# Patient Record
Sex: MALE | Race: WHITE | NOT HISPANIC OR LATINO | Employment: OTHER | RURAL
[De-identification: names, ages, dates, MRNs, and addresses within clinical notes are randomized per-mention and may not be internally consistent; named-entity substitution may affect disease eponyms.]

---

## 2018-02-01 ENCOUNTER — HISTORICAL (OUTPATIENT)
Dept: ADMINISTRATIVE | Facility: HOSPITAL | Age: 33
End: 2018-02-01

## 2018-02-02 LAB
LAB AP CLINICAL INFORMATION: NORMAL
LAB AP DIAGNOSIS - HISTORICAL: NORMAL
LAB AP GROSS PATHOLOGY - HISTORICAL: NORMAL
LAB AP SPECIMEN SUBMITTED - HISTORICAL: NORMAL

## 2022-07-21 ENCOUNTER — HOSPITAL ENCOUNTER (EMERGENCY)
Facility: HOSPITAL | Age: 37
Discharge: HOME | End: 2022-07-21
Attending: INTERNAL MEDICINE

## 2022-07-21 VITALS
HEIGHT: 74 IN | SYSTOLIC BLOOD PRESSURE: 137 MMHG | BODY MASS INDEX: 26.95 KG/M2 | HEART RATE: 72 BPM | WEIGHT: 210 LBS | RESPIRATION RATE: 18 BRPM | DIASTOLIC BLOOD PRESSURE: 86 MMHG | OXYGEN SATURATION: 100 % | TEMPERATURE: 98 F

## 2022-07-21 DIAGNOSIS — R55 NEAR SYNCOPE: ICD-10-CM

## 2022-07-21 DIAGNOSIS — J32.0 LEFT MAXILLARY SINUSITIS: ICD-10-CM

## 2022-07-21 DIAGNOSIS — R42 DIZZINESS: Primary | ICD-10-CM

## 2022-07-21 LAB
ANION GAP SERPL CALCULATED.3IONS-SCNC: 19 MMOL/L (ref 7–16)
BASOPHILS # BLD AUTO: 0.03 K/UL (ref 0–0.2)
BASOPHILS NFR BLD AUTO: 0.5 % (ref 0–1)
BUN SERPL-MCNC: 14 MG/DL (ref 7–18)
BUN/CREAT SERPL: 14 (ref 6–20)
CALCIUM SERPL-MCNC: 9.2 MG/DL (ref 8.5–10.1)
CHLORIDE SERPL-SCNC: 97 MMOL/L (ref 98–107)
CO2 SERPL-SCNC: 23 MMOL/L (ref 21–32)
CREAT SERPL-MCNC: 0.97 MG/DL (ref 0.7–1.3)
DIFFERENTIAL METHOD BLD: ABNORMAL
EOSINOPHIL # BLD AUTO: 0.06 K/UL (ref 0–0.5)
EOSINOPHIL NFR BLD AUTO: 1 % (ref 1–4)
ERYTHROCYTE [DISTWIDTH] IN BLOOD BY AUTOMATED COUNT: 13.3 % (ref 11.5–14.5)
GLUCOSE SERPL-MCNC: 167 MG/DL (ref 74–106)
HCT VFR BLD AUTO: 49.8 % (ref 40–54)
HGB BLD-MCNC: 16.5 G/DL (ref 13.5–18)
IMM GRANULOCYTES # BLD AUTO: 0.02 K/UL (ref 0–0.04)
IMM GRANULOCYTES NFR BLD: 0.3 % (ref 0–0.4)
LYMPHOCYTES # BLD AUTO: 2.47 K/UL (ref 1–4.8)
LYMPHOCYTES NFR BLD AUTO: 40 % (ref 27–41)
MCH RBC QN AUTO: 29.5 PG (ref 27–31)
MCHC RBC AUTO-ENTMCNC: 33.1 G/DL (ref 32–36)
MCV RBC AUTO: 88.9 FL (ref 80–96)
MONOCYTES # BLD AUTO: 0.67 K/UL (ref 0–0.8)
MONOCYTES NFR BLD AUTO: 10.8 % (ref 2–6)
MPC BLD CALC-MCNC: 10.2 FL (ref 9.4–12.4)
NEUTROPHILS # BLD AUTO: 2.93 K/UL (ref 1.8–7.7)
NEUTROPHILS NFR BLD AUTO: 47.4 % (ref 53–65)
PLATELET # BLD AUTO: 278 K/UL (ref 150–400)
POTASSIUM SERPL-SCNC: 3.6 MMOL/L (ref 3.5–5.1)
RBC # BLD AUTO: 5.6 M/UL (ref 4.6–6.2)
SODIUM SERPL-SCNC: 135 MMOL/L (ref 136–145)
TROPONIN I SERPL HS-MCNC: 6.4 PG/ML
WBC # BLD AUTO: 6.18 K/UL (ref 4.5–11)

## 2022-07-21 PROCEDURE — 93010 EKG 12-LEAD: ICD-10-PCS | Mod: ,,, | Performed by: INTERNAL MEDICINE

## 2022-07-21 PROCEDURE — 99285 EMERGENCY DEPT VISIT HI MDM: CPT | Mod: 25

## 2022-07-21 PROCEDURE — 63600175 PHARM REV CODE 636 W HCPCS: Performed by: INTERNAL MEDICINE

## 2022-07-21 PROCEDURE — 25000003 PHARM REV CODE 250: Performed by: INTERNAL MEDICINE

## 2022-07-21 PROCEDURE — 85025 COMPLETE CBC W/AUTO DIFF WBC: CPT | Performed by: INTERNAL MEDICINE

## 2022-07-21 PROCEDURE — 80048 BASIC METABOLIC PNL TOTAL CA: CPT | Performed by: INTERNAL MEDICINE

## 2022-07-21 PROCEDURE — 93010 ELECTROCARDIOGRAM REPORT: CPT | Mod: ,,, | Performed by: INTERNAL MEDICINE

## 2022-07-21 PROCEDURE — 99284 EMERGENCY DEPT VISIT MOD MDM: CPT | Mod: ,,, | Performed by: INTERNAL MEDICINE

## 2022-07-21 PROCEDURE — 36415 COLL VENOUS BLD VENIPUNCTURE: CPT | Performed by: INTERNAL MEDICINE

## 2022-07-21 PROCEDURE — 96374 THER/PROPH/DIAG INJ IV PUSH: CPT

## 2022-07-21 PROCEDURE — 99284 PR EMERGENCY DEPT VISIT,LEVEL IV: ICD-10-PCS | Mod: ,,, | Performed by: INTERNAL MEDICINE

## 2022-07-21 PROCEDURE — 93005 ELECTROCARDIOGRAM TRACING: CPT

## 2022-07-21 PROCEDURE — 84484 ASSAY OF TROPONIN QUANT: CPT | Performed by: INTERNAL MEDICINE

## 2022-07-21 RX ORDER — ONDANSETRON 2 MG/ML
4 INJECTION INTRAMUSCULAR; INTRAVENOUS
Status: COMPLETED | OUTPATIENT
Start: 2022-07-21 | End: 2022-07-21

## 2022-07-21 RX ORDER — CETIRIZINE HYDROCHLORIDE 10 MG/1
10 TABLET ORAL DAILY
Qty: 30 TABLET | Refills: 0 | Status: SHIPPED | OUTPATIENT
Start: 2022-07-21 | End: 2023-05-23

## 2022-07-21 RX ORDER — MECLIZINE HYDROCHLORIDE 25 MG/1
25 TABLET ORAL 3 TIMES DAILY PRN
Qty: 20 TABLET | Refills: 0 | Status: SHIPPED | OUTPATIENT
Start: 2022-07-21 | End: 2023-05-23

## 2022-07-21 RX ORDER — MECLIZINE HCL 12.5 MG 12.5 MG/1
25 TABLET ORAL
Status: COMPLETED | OUTPATIENT
Start: 2022-07-21 | End: 2022-07-21

## 2022-07-21 RX ADMIN — SODIUM CHLORIDE 1000 ML: 9 INJECTION, SOLUTION INTRAVENOUS at 12:07

## 2022-07-21 RX ADMIN — ONDANSETRON 4 MG: 2 INJECTION INTRAMUSCULAR; INTRAVENOUS at 01:07

## 2022-07-21 RX ADMIN — MECLIZINE HYDROCHLORIDE 25 MG: 12.5 TABLET ORAL at 01:07

## 2022-07-21 NOTE — ED PROVIDER NOTES
Encounter Date: 7/21/2022       History     Chief Complaint   Patient presents with    Dizziness      Patient states he was riding his  this morning, when he stepped off he had dizziness   And vertigo symptoms. Patient denies any chest pain, shortness of breath, headache, nausea vomiting.  Patient states he took her indomethacin for gout in his foot this morning.        Review of patient's allergies indicates:  No Known Allergies  Past Medical History:   Diagnosis Date    Gout, unspecified      Past Surgical History:   Procedure Laterality Date    CLAVICLE SURGERY      HERNIA REPAIR       History reviewed. No pertinent family history.  Social History     Tobacco Use    Smoking status: Never Smoker   Substance Use Topics    Alcohol use: Yes     Review of Systems   Constitutional: Negative for fever.   HENT: Negative for sore throat.    Respiratory: Negative for shortness of breath.    Cardiovascular: Negative for chest pain.   Gastrointestinal: Negative for nausea.   Genitourinary: Negative for dysuria.   Musculoskeletal: Negative for back pain.   Skin: Negative for rash.   Neurological: Negative for weakness.   Hematological: Does not bruise/bleed easily.       Physical Exam     Initial Vitals [07/21/22 1237]   BP Pulse Resp Temp SpO2   (!) 149/93 74 18 97.9 °F (36.6 °C) 98 %      MAP       --         Physical Exam    Nursing note and vitals reviewed.  Constitutional: He appears well-developed.   HENT:   Head: Normocephalic.   Eyes: Pupils are equal, round, and reactive to light.   Neck:   Normal range of motion.  Cardiovascular: Normal rate.   Pulmonary/Chest: Breath sounds normal.   Abdominal: Abdomen is soft.   Musculoskeletal:         General: Normal range of motion.      Cervical back: Normal range of motion.     Neurological: He is alert. He has normal strength and normal reflexes. No cranial nerve deficit. GCS eye subscore is 4. GCS verbal subscore is 5. GCS motor subscore is 6.   Skin: Skin  is warm.         Medical Screening Exam   See Full Note    ED Course   Procedures  Labs Reviewed   BASIC METABOLIC PANEL - Abnormal; Notable for the following components:       Result Value    Sodium 135 (*)     Chloride 97 (*)     Anion Gap 19 (*)     Glucose 167 (*)     All other components within normal limits   CBC WITH DIFFERENTIAL - Abnormal; Notable for the following components:    Neutrophils % 47.4 (*)     Monocytes % 10.8 (*)     All other components within normal limits   TROPONIN I - Normal   CBC W/ AUTO DIFFERENTIAL    Narrative:     The following orders were created for panel order CBC auto differential.  Procedure                               Abnormality         Status                     ---------                               -----------         ------                     CBC with Differential[[id number]]        Abnormal            Final result                 Please view results for these tests on the individual orders.        ECG Results          EKG 12-lead (Final result)  Result time 07/21/22 12:52:47    Final result by Interface, Lab In [facility] (07/21/22 12:52:47)                 Narrative:    Test Reason : R55,    Vent. Rate : 073 BPM     Atrial Rate : 073 BPM     P-R Int : 110 ms          QRS Dur : 094 ms      QT Int : 408 ms       P-R-T Axes : 038 045 -16 degrees     QTc Int : 449 ms    Sinus rhythm with short VA  T wave abnormality, consider inferior ischemia  Abnormal ECG  No previous ECGs available  Confirmed by Daniel Frost MD ([id number]) on 7/21/2022 12:52:35 PM    Referred By: NARINDER   SELF           Confirmed By:Daniel Frost MD                  ED Interpretation by Daniel Frost MD (07/21/22 12:49:02, [facility] Emergency Department, Emergency Medicine)    Normal sinus rhythm rate of 73 no acute ischemic changes                            Imaging Results          CT Head Without Contrast (Final result)  Result time 07/21/22 13:01:54    Final result by Guido Martin MD  (07/21/22 13:01:54)                 Impression:      No acute intracranial process    Mild left maxillary sinusitis      Electronically signed by: Guido Cooperbrendamerissa  Date:    07/21/2022  Time:    13:01             Narrative:    EXAMINATION:  CT head without contrast    CLINICAL HISTORY:  Dizziness, persistent/recurrent, cardiac or vascular cause suspected;    TECHNIQUE:  Transaxial CT sections were obtained through the brain without contrast.    The CT examination was performed using one or more of the following dose reduction techniques: Automated exposure control, adjustment of the mA and kV according to patient's size, use of acute or iterative reconstruction techniques.    COMPARISON:  No previous head CT available    FINDINGS:  The ventricles are midline in position without evidence of hydrocephalus. There is no mass or area of parenchymal hemorrhage. There is no gross CT evidence of acute cortical stroke. There is no extra-axial hematoma. There is a small fluid level in the largely visualized left maxillary sinus.  There is no obvious skull fracture.                                 Medications   meclizine tablet 25 mg (has no administration in time range)   sodium chloride 0.9% bolus 1,000 mL (0 mLs Intravenous Stopped 7/21/22 1321)   ondansetron injection 4 mg (4 mg Intravenous Given 7/21/22 1330)     Medical Decision Making:   ED Management:   Discuss results of the test with the patient, CT scan showed left maxillary sinusitis, blood sugar was 167 but is nonfasting, this suggested patient follow with his primary care provider, and recheck labs, and refer to specialist if indicated    Additional MDM:   PERC Rule:   Age is greater than or equal to 50 = 0.0  Heart Rate is greater than or equal to 100 = 0.0  SaO2 on room air < 95% = 0.0  Unilateral leg swelling = 0.0  Hemoptysis = 0.0  Recent surgery or trauma = 0.0  Prior PE or DVT =  0.0  Hormone use = 0.00  PERC Score = 0    Well's Criteria Score:  -Clinical  symptoms of DVT (leg swelling, pain with palpation) = 0.0  -Other diagnosis less likely than pulmonary embolism =            0.0  -Heart Rate >100 =   0.0  -Immobilization (= or > than 3 days) or surgery in the previous 4 weeks = 0.0  -Previous DVT/PE = 0.0  -Hemoptysis =          0.0  -Malignancy =           0.0  Well's Probability Score =    0        DILSHAD Score:   Age over 65:                                    0.00   > or = to 3 CAD risk factors:           0.00  Established CAD:                            0.00  > or = to 2 anginal events in the past 24 hours: 0.00  Use of ASA in past 7 days:              0.00  Elevated Enzymes:                         0.00  ST Depression > or = to 0.05 mV:  0.00  DILSHAD score: 0    NIH Stroke Scale:   Level of consciousness = 0 - alert  LOC questions = 0 - answers both correctly  LOC commands = 0 - performs both correctly  Best gaze = 0 - normal  Visual = 0 - no visual loss  Facial palsy = 0 - normal  Motor left arm =  0 - no drift  Motor right arm =  0 - no drift  Motor left leg = 0 - no drift  Motor right leg =  0 - no drift  Limb ataxia = 0 - absent  Sensory = 0 - normal  Best language = 0 - no aphasia  Dysarthria = 0 - normal articulation  Extinction and inattention = 0 - no neglect  NIH Stroke Scale Total = 0          ED Course as of 07/21/22 1342   Thu Jul 21, 2022   1249 EKG 12-lead [PW]   1255 CBC auto differential(!) [PW]   1306 CT Head Without Contrast [PW]   1307 Troponin I [PW]   1307 Troponin I High Sensitivity: 6.4 [PW]   1307 Basic metabolic panel(!) [PW]   1340  Call to patient room because he still was somewhat dizzy.  He said the chest tightness went away.  He states that he feels better but not back to normal.  Offered to transfer patient for neurologic evaluation but he refuses.  States that this time he prefer to follow with his primary care provider [PW]      ED Course User Index  [PW] Daniel Frost MD          Clinical Impression:   Final diagnoses:  [R55]  Near syncope  [R42] Dizziness (Primary)  [J32.0] Left maxillary sinusitis          ED Disposition Condition    Discharge Stable        ED Prescriptions     Medication Sig Dispense Start Date End Date Auth. Provider    cetirizine (ZYRTEC) 10 MG tablet Take 1 tablet (10 mg total) by mouth once daily. 30 tablet 7/21/2022 7/21/2023 Daniel Frost MD    meclizine (ANTIVERT) 25 mg tablet Take 1 tablet (25 mg total) by mouth 3 (three) times daily as needed. 20 tablet 7/21/2022  Daniel Frost MD        Follow-up Information     Follow up With Specialties Details Why Contact Info    Migdalia Franco MD Family Medicine In 1 day  [address]  [facility]   [place] [id number] [phone]             Daniel Frost MD  07/21/22 1323       Daniel Frost MD  07/21/22 1326       Daniel Frost MD  07/21/22 134

## 2022-07-22 ENCOUNTER — TELEPHONE (OUTPATIENT)
Dept: EMERGENCY MEDICINE | Facility: HOSPITAL | Age: 37
End: 2022-07-22

## 2023-05-23 ENCOUNTER — OFFICE VISIT (OUTPATIENT)
Dept: FAMILY MEDICINE | Facility: CLINIC | Age: 38
End: 2023-05-23

## 2023-05-23 VITALS
HEIGHT: 74 IN | TEMPERATURE: 98 F | SYSTOLIC BLOOD PRESSURE: 126 MMHG | BODY MASS INDEX: 26.33 KG/M2 | WEIGHT: 205.19 LBS | OXYGEN SATURATION: 97 % | HEART RATE: 58 BPM | DIASTOLIC BLOOD PRESSURE: 78 MMHG

## 2023-05-23 DIAGNOSIS — F33.1 MODERATE EPISODE OF RECURRENT MAJOR DEPRESSIVE DISORDER: ICD-10-CM

## 2023-05-23 DIAGNOSIS — M77.12 LEFT TENNIS ELBOW: Primary | ICD-10-CM

## 2023-05-23 PROCEDURE — 99213 OFFICE O/P EST LOW 20 MIN: CPT | Mod: ,,, | Performed by: FAMILY MEDICINE

## 2023-05-23 PROCEDURE — 99213 PR OFFICE/OUTPT VISIT, EST, LEVL III, 20-29 MIN: ICD-10-PCS | Mod: ,,, | Performed by: FAMILY MEDICINE

## 2023-05-23 RX ORDER — BUPROPION HYDROCHLORIDE 100 MG/1
100 TABLET, EXTENDED RELEASE ORAL 2 TIMES DAILY
Qty: 60 TABLET | Refills: 11 | Status: SHIPPED | OUTPATIENT
Start: 2023-05-23 | End: 2024-05-22

## 2023-05-23 NOTE — PROGRESS NOTES
Gagandeep Grace MD   [facility]  [address] [place] [address]     PATIENT NAME: Harshad Bhakta  : 1985  DATE: 23  MRN: [id number]      Billing Provider: Gagandeep Grace MD  Level of Service: [place] OFFICE/OUTPT VISIT, EST, LEVL III, 20-29 MIN  Patient PCP Information       Provider PCP Type    Migdalia Franco MD General            Reason for Visit / Chief Complaint: Elbow Pain (Left elbow pain-increased with lifting and squeezing objects x 2 weeks. ) and Mood (Discuss mood. In the past taking medication that helped with mood. Lexapro 10mg rx'd in .)         History of Present Illness / Problem Focused Workflow     Harshad Bhakta presents to the clinic with Elbow Pain (Left elbow pain-increased with lifting and squeezing objects x 2 weeks. ) and Mood (Discuss mood. In the past taking medication that helped with mood. Lexapro 10mg rx'd in .)     HPI    Review of Systems     Review of Systems   Constitutional:  Negative for activity change, appetite change, fatigue and fever.   HENT:  Negative for nasal congestion, ear pain, hearing loss, sinus pressure/congestion and sore throat.    Respiratory:  Negative for cough, chest tightness and shortness of breath.    Cardiovascular:  Negative for chest pain and palpitations.   Gastrointestinal:  Negative for abdominal pain and fecal incontinence.   Genitourinary:  Negative for bladder incontinence, difficulty urinating and erectile dysfunction.   Musculoskeletal:  Negative for arthralgias.   Integumentary:  Negative for rash.   Neurological:  Negative for dizziness and headaches.      Medical / Social / Family History     Past Medical History:   Diagnosis Date    Gout, unspecified        Past Surgical History:   Procedure Laterality Date    CLAVICLE SURGERY      HERNIA REPAIR         Social History  Harshad Bhakta  reports that he has never smoked. He does not have any smokeless tobacco history on  file. He reports current alcohol use.    Family History  Harshad Bhakta  family history is not on file.    Medications and Allergies     Medications  No outpatient medications have been marked as taking for the 5/23/23 encounter (Office Visit) with Gagandeep Grace MD.       Allergies  Review of patient's allergies indicates:   Allergen Reactions    Zinacef [cefuroxime sodium] Rash       Physical Examination     Vitals:    05/23/23 1451   BP: 126/78   Pulse: (!) 58   Temp: 98.4 °F (36.9 °C)     Physical Exam  Constitutional:       General: He is not in acute distress.     Appearance: He is not ill-appearing.   HENT:      Head: Normocephalic and atraumatic.      Right Ear: Tympanic membrane and ear canal normal.      Left Ear: Tympanic membrane and ear canal normal.      Nose: Nose normal. No congestion or rhinorrhea.   Eyes:      Pupils: Pupils are equal, round, and reactive to light.   Cardiovascular:      Rate and Rhythm: Normal rate and regular rhythm.      Pulses: Normal pulses.      Heart sounds: No murmur heard.  Pulmonary:      Effort: No respiratory distress.      Breath sounds: No wheezing, rhonchi or rales.   Abdominal:      General: Bowel sounds are normal.      Palpations: Abdomen is soft.      Tenderness: There is no abdominal tenderness.      Hernia: No hernia is present.   Musculoskeletal:         General: Tenderness (left lateral epychondyle worse with wrist extension) present.      Cervical back: Normal range of motion and neck supple.   Lymphadenopathy:      Cervical: No cervical adenopathy.   Skin:     General: Skin is warm and dry.   Neurological:      Mental Status: He is alert.   Psychiatric:         Behavior: Behavior normal.         Thought Content: Thought content normal.        Assessment and Plan (including Health Maintenance)   :    Plan:         Health Maintenance Due   Topic Date Due    Hepatitis C Screening  Never done    Lipid Panel  Never done    COVID-19 Vaccine (1) Never  done    HIV Screening  Never done    TETANUS VACCINE  Never done    Hemoglobin A1c (Diabetic Prevention Screening)  Never done       Problem List Items Addressed This Visit    None  Visit Diagnoses       Left tennis elbow    -  Primary    Moderate episode of recurrent major depressive disorder              Left tennis elbow    Moderate episode of recurrent major depressive disorder    Other orders  -     buPROPion (WELLBUTRIN SR) 100 MG TBSR 12 hr tablet; Take 1 tablet (100 mg total) by mouth 2 (two) times daily.  Dispense: 60 tablet; Refill: 11       Health Maintenance Topics with due status: Not Due       Topic Last Completion Date    Influenza Vaccine Not Due       Procedures     No future appointments.     No follow-ups on file.       Signature:  Gagandeep Grace MD  [facility]  [address] [place] [address]  [phone] Phone  [phone] Fax    Date of encounter: 5/23/23

## 2024-04-29 ENCOUNTER — TELEPHONE (OUTPATIENT)
Dept: FAMILY MEDICINE | Facility: CLINIC | Age: 39
End: 2024-04-29

## 2024-04-29 NOTE — TELEPHONE ENCOUNTER
Spoke with patient. Patient had refill at pharmacy and had it filled last week. Instructed patient time for check up. Patient request to call back or be walk in. Denies any other needs at present.

## 2024-04-29 NOTE — TELEPHONE ENCOUNTER
----- Message from Madeline Aden sent at 4/26/2024  7:15 AM CDT -----  Regarding: refill  Patient needs a refill on buPROPion (WELLBUTRIN SR) 100 MG TBSR 12 called into [facility] pharmacy at [phone].  Please call patient at [phone] if you have any questions. Thanks!

## 2024-05-23 ENCOUNTER — OFFICE VISIT (OUTPATIENT)
Dept: FAMILY MEDICINE | Facility: CLINIC | Age: 39
End: 2024-05-23

## 2024-05-23 VITALS — SYSTOLIC BLOOD PRESSURE: 120 MMHG | DIASTOLIC BLOOD PRESSURE: 88 MMHG

## 2024-05-23 DIAGNOSIS — Z79.899 LONG-TERM USE OF HIGH-RISK MEDICATION: Primary | ICD-10-CM

## 2024-05-23 DIAGNOSIS — R73.9 HYPERGLYCEMIA: ICD-10-CM

## 2024-05-23 DIAGNOSIS — N52.9 ERECTILE DYSFUNCTION, UNSPECIFIED ERECTILE DYSFUNCTION TYPE: ICD-10-CM

## 2024-05-23 DIAGNOSIS — F33.1 MODERATE EPISODE OF RECURRENT MAJOR DEPRESSIVE DISORDER: ICD-10-CM

## 2024-05-23 LAB
ANION GAP SERPL CALCULATED.3IONS-SCNC: 12 MMOL/L (ref 7–16)
BUN SERPL-MCNC: 7 MG/DL (ref 7–18)
BUN/CREAT SERPL: 6 (ref 6–20)
CALCIUM SERPL-MCNC: 9.5 MG/DL (ref 8.5–10.1)
CHLORIDE SERPL-SCNC: 100 MMOL/L (ref 98–107)
CO2 SERPL-SCNC: 30 MMOL/L (ref 21–32)
CREAT SERPL-MCNC: 1.09 MG/DL (ref 0.7–1.3)
EGFR (NO RACE VARIABLE) (RUSH/TITUS): 89 ML/MIN/1.73M2
EST. AVERAGE GLUCOSE BLD GHB EST-MCNC: 108 MG/DL
GLUCOSE SERPL-MCNC: 106 MG/DL (ref 74–106)
HBA1C MFR BLD HPLC: 5.4 % (ref 4.5–6.6)
POTASSIUM SERPL-SCNC: 4.6 MMOL/L (ref 3.5–5.1)
SODIUM SERPL-SCNC: 137 MMOL/L (ref 136–145)

## 2024-05-23 PROCEDURE — 80048 BASIC METABOLIC PNL TOTAL CA: CPT | Mod: ,,, | Performed by: CLINICAL MEDICAL LABORATORY

## 2024-05-23 PROCEDURE — 83036 HEMOGLOBIN GLYCOSYLATED A1C: CPT | Mod: ,,, | Performed by: CLINICAL MEDICAL LABORATORY

## 2024-05-23 PROCEDURE — 99214 OFFICE O/P EST MOD 30 MIN: CPT | Mod: ,,, | Performed by: FAMILY MEDICINE

## 2024-05-23 RX ORDER — BUPROPION HYDROCHLORIDE 150 MG/1
150 TABLET ORAL DAILY
Qty: 30 TABLET | Refills: 11 | Status: SHIPPED | OUTPATIENT
Start: 2024-05-23 | End: 2025-05-23

## 2024-05-23 NOTE — PROGRESS NOTES
Gagandeep Grace MD   [facility]  [address] [place] [address]     PATIENT NAME: Harshad Bhakta  : 1985  DATE: 24  MRN: [id number]      Billing Provider: Gagandeep Grace MD  Level of Service: [place] OFFICE/OUTPT VISIT, EST, LEVL IV, 30-39 MIN  Patient PCP Information       Provider PCP Type    Gagandeep Grace MD General            Reason for Visit / Chief Complaint: Depression (Check up and labs. Patient states he does not like the Wellbutrin, it makes him feel tired all the time. Wants to discuss other options. Patient wants to discuss checking testosterone level. )         History of Present Illness / Problem Focused Workflow     Harshad Bhakta presents to the clinic with Depression (Check up and labs. Patient states he does not like the Wellbutrin, it makes him feel tired all the time. Wants to discuss other options. Patient wants to discuss checking testosterone level. )     HPI    Review of Systems     Review of Systems   Constitutional:  Positive for fatigue. Negative for activity change, appetite change and fever.   HENT:  Negative for nasal congestion, ear pain, hearing loss, sinus pressure/congestion and sore throat.    Respiratory:  Negative for cough, chest tightness and shortness of breath.    Cardiovascular:  Negative for chest pain and palpitations.   Gastrointestinal:  Negative for abdominal pain and fecal incontinence.   Genitourinary:  Positive for erectile dysfunction. Negative for bladder incontinence and difficulty urinating.   Musculoskeletal:  Negative for arthralgias.   Integumentary:  Negative for rash.   Neurological:  Negative for dizziness and headaches.        Medical / Social / Family History     Past Medical History:   Diagnosis Date    Gout, unspecified        Past Surgical History:   Procedure Laterality Date    CLAVICLE SURGERY      HERNIA REPAIR         Social History  Harshad Bhakta  reports that he has never  smoked. He does not have any smokeless tobacco history on file. He reports current alcohol use.    Family History  Harshad Bhakta  family history is not on file.    Medications and Allergies     Medications  Outpatient Medications Marked as Taking for the 5/23/24 encounter (Office Visit) with Gagandeep Grace MD   Medication Sig Dispense Refill    buPROPion (WELLBUTRIN SR) 100 MG TBSR 12 hr tablet Take 1 tablet (100 mg total) by mouth 2 (two) times daily. 60 tablet 11       Allergies  Review of patient's allergies indicates:   Allergen Reactions    Zinacef [cefuroxime sodium] Rash       Physical Examination     Vitals:    05/23/24 1353   BP: 120/88   Pulse: (P) 87   Temp: (P) 98.9 °F (37.2 °C)     Physical Exam  Constitutional:       General: He is not in acute distress.     Appearance: He is not ill-appearing.   HENT:      Head: Normocephalic and atraumatic.      Right Ear: Tympanic membrane and ear canal normal.      Left Ear: Tympanic membrane and ear canal normal.      Nose: Nose normal. No congestion or rhinorrhea.   Eyes:      Pupils: Pupils are equal, round, and reactive to light.   Cardiovascular:      Rate and Rhythm: Normal rate and regular rhythm.      Pulses: Normal pulses.      Heart sounds: No murmur heard.  Pulmonary:      Effort: No respiratory distress.      Breath sounds: No wheezing, rhonchi or rales.   Abdominal:      General: Bowel sounds are normal.      Palpations: Abdomen is soft.      Tenderness: There is no abdominal tenderness.      Hernia: No hernia is present.   Musculoskeletal:      Cervical back: Normal range of motion and neck supple.   Lymphadenopathy:      Cervical: No cervical adenopathy.   Skin:     General: Skin is warm and dry.   Neurological:      Mental Status: He is alert.   Psychiatric:         Behavior: Behavior normal.         Thought Content: Thought content normal.          Assessment and Plan (including Health Maintenance)   :    Plan:         Health  Maintenance Due   Topic Date Due    Hepatitis C Screening  Never done    Lipid Panel  Never done    HIV Screening  Never done    TETANUS VACCINE  Never done    COVID-19 Vaccine (1 - 2023-24 season) Never done       Problem List Items Addressed This Visit    None  Visit Diagnoses       Long-term use of high-risk medication    -  Primary    Relevant Orders    Basic Metabolic Panel (Completed)    Hemoglobin A1C (Completed)    Hyperglycemia        Relevant Orders    Basic Metabolic Panel (Completed)    Hemoglobin A1C (Completed)    Erectile dysfunction, unspecified erectile dysfunction type        Relevant Orders    Testosterone, Free & Total    Moderate episode of recurrent major depressive disorder              Long-term use of high-risk medication  -     Basic Metabolic Panel; Future; Expected date: 05/23/2024  -     Hemoglobin A1C; Future; Expected date: 05/23/2024    Hyperglycemia  -     Basic Metabolic Panel; Future; Expected date: 05/23/2024  -     Hemoglobin A1C; Future; Expected date: 05/23/2024    Erectile dysfunction, unspecified erectile dysfunction type  -     Testosterone, Free & Total; Future; Expected date: 05/23/2024    Moderate episode of recurrent major depressive disorder    Other orders  -     buPROPion (WELLBUTRIN XL) 150 MG TB24 tablet; Take 1 tablet (150 mg total) by mouth once daily.  Dispense: 30 tablet; Refill: 11       Health Maintenance Topics with due status: Not Due       Topic Last Completion Date    Hemoglobin A1c (Diabetic Prevention Screening) 05/23/2024    Influenza Vaccine Not Due       Procedures     No future appointments.     No follow-ups on file.       Signature:  Gagandeep Grace MD  [facility]  [address] [facility]   [place] [address]  [phone] Phone  [phone] Fax    Date of encounter: 5/23/24

## 2024-05-28 ENCOUNTER — LAB VISIT (OUTPATIENT)
Dept: LAB | Facility: CLINIC | Age: 39
End: 2024-05-28

## 2024-05-28 DIAGNOSIS — N52.9 ERECTILE DYSFUNCTION, UNSPECIFIED ERECTILE DYSFUNCTION TYPE: ICD-10-CM

## 2024-05-28 PROCEDURE — 36415 COLL VENOUS BLD VENIPUNCTURE: CPT

## 2024-05-28 PROCEDURE — 84403 ASSAY OF TOTAL TESTOSTERONE: CPT | Mod: 90,,, | Performed by: CLINICAL MEDICAL LABORATORY

## 2024-05-28 PROCEDURE — 84402 ASSAY OF FREE TESTOSTERONE: CPT | Mod: 90,,, | Performed by: CLINICAL MEDICAL LABORATORY

## 2024-06-07 LAB
TESTOST FREE SERPL-MCNC: 34.5 NG/DL (ref 4.65–18.1)
TESTOST SERPL-MCNC: 857 NG/DL (ref 240–950)

## 2025-04-28 ENCOUNTER — OFFICE VISIT (OUTPATIENT)
Dept: FAMILY MEDICINE | Facility: CLINIC | Age: 40
End: 2025-04-28

## 2025-04-28 VITALS
DIASTOLIC BLOOD PRESSURE: 88 MMHG | HEART RATE: 67 BPM | TEMPERATURE: 99 F | OXYGEN SATURATION: 97 % | SYSTOLIC BLOOD PRESSURE: 130 MMHG

## 2025-04-28 DIAGNOSIS — R55 NEAR SYNCOPE: Primary | ICD-10-CM

## 2025-04-28 PROCEDURE — 85025 COMPLETE CBC W/AUTO DIFF WBC: CPT | Mod: ,,, | Performed by: CLINICAL MEDICAL LABORATORY

## 2025-04-28 PROCEDURE — 99213 OFFICE O/P EST LOW 20 MIN: CPT | Mod: ,,, | Performed by: FAMILY MEDICINE

## 2025-04-28 PROCEDURE — 80048 BASIC METABOLIC PNL TOTAL CA: CPT | Mod: ,,, | Performed by: CLINICAL MEDICAL LABORATORY

## 2025-04-28 NOTE — PROGRESS NOTES
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   HENT:  Negative for nasal congestion, ear pain, hearing loss, sinus pressure/congestion and sore throat.    Respiratory:  Negative for cough, chest tightness and shortness of breath.    Cardiovascular:  Negative for chest pain and palpitations.   Gastrointestinal:  Negative for abdominal pain and fecal incontinence.   Genitourinary:  Negative for bladder incontinence, difficulty urinating and erectile dysfunction.   Musculoskeletal:  Negative for arthralgias.   Integumentary:  Negative for rash.   Neurological:  Negative for dizziness and headaches.        Medical / Social / Family History     Past Medical History:   Diagnosis Date    Gout, unspecified        Past Surgical History:   Procedure Laterality Date    CLAVICLE SURGERY      HERNIA REPAIR         Social History  Harshad Bhakta  reports that he has never smoked. He does not have any smokeless tobacco history on file. He reports current alcohol use. He reports that he does not use drugs.    Family History  Harshad Bhakta  family history includes Heart attack in his maternal uncle; acute leukemia in his maternal grandmother.    Medications and Allergies      Medications  No outpatient medications have been marked as taking for the 4/28/25 encounter (Office Visit) with Gagandeep Grace MD.       Allergies  Review of patient's allergies indicates:   Allergen Reactions    Zinacef [cefuroxime sodium] Rash       Physical Examination     Vitals:    04/28/25 1509   BP: 130/88   Pulse: 67   Temp: 98.8 °F (37.1 °C)     Physical Exam  Constitutional:       General: He is not in acute distress.     Appearance: He is not ill-appearing.   HENT:      Head: Normocephalic and atraumatic.      Right Ear: Tympanic membrane and ear canal normal.      Left Ear: Tympanic membrane and ear canal normal.      Nose: Nose normal. No congestion or rhinorrhea.   Eyes:      Pupils: Pupils are equal, round, and reactive to light.   Cardiovascular:      Rate and Rhythm: Normal rate and regular rhythm.      Pulses: Normal pulses.      Heart sounds: No murmur heard.  Pulmonary:      Effort: No respiratory distress.      Breath sounds: No wheezing, rhonchi or rales.   Abdominal:      General: Bowel sounds are normal.      Palpations: Abdomen is soft.      Tenderness: There is no abdominal tenderness.      Hernia: No hernia is present.   Musculoskeletal:      Cervical back: Normal range of motion and neck supple.   Lymphadenopathy:      Cervical: No cervical adenopathy.   Skin:     General: Skin is warm and dry.   Neurological:      Mental Status: He is alert.   Psychiatric:         Behavior: Behavior normal.         Thought Content: Thought content normal.          Assessment and Plan (including Health Maintenance)   :    Plan:         Health Maintenance Due   Topic Date Due    Hepatitis C Screening  Never done    Lipid Panel  Never done    HIV Screening  Never done    TETANUS VACCINE  Never done    Influenza Vaccine (1) Never done    COVID-19 Vaccine (1 - 2024-25 season) Never done       Problem List Items Addressed This Visit    None  Visit Diagnoses         Near syncope    -  Primary     Relevant Orders    Basic Metabolic Panel    CBC Auto Differential    Ambulatory referral/consult to Cardiology          Near syncope  -     Basic Metabolic Panel; Future  -     CBC Auto Differential; Future  -     Ambulatory referral/consult to Cardiology; Future; Expected date: 05/05/2025       Health Maintenance Topics with due status: Not Due       Topic Last Completion Date    Hemoglobin A1c (Diabetic Prevention Screening) 05/23/2024    RSV Vaccine (Age 60+ and Pregnant patients) Not Due       Procedures     No future appointments.     No follow-ups on file.       Signature:  Gagandeep Grace MD  [facility]  [address] [place] [address]  [phone] Phone  [phone] Fax    Date of encounter: 4/28/25

## 2025-04-29 LAB
ANION GAP SERPL CALCULATED.3IONS-SCNC: 17 MMOL/L (ref 7–16)
BASOPHILS # BLD AUTO: 0.06 K/UL (ref 0–0.2)
BASOPHILS NFR BLD AUTO: 0.9 % (ref 0–1)
BUN SERPL-MCNC: 12 MG/DL (ref 9–21)
BUN/CREAT SERPL: 13 (ref 6–20)
CALCIUM SERPL-MCNC: 10.3 MG/DL (ref 8.4–10.2)
CHLORIDE SERPL-SCNC: 99 MMOL/L (ref 98–107)
CO2 SERPL-SCNC: 25 MMOL/L (ref 22–29)
CREAT SERPL-MCNC: 0.94 MG/DL (ref 0.72–1.25)
DIFFERENTIAL METHOD BLD: ABNORMAL
EGFR (NO RACE VARIABLE) (RUSH/TITUS): 106 ML/MIN/1.73M2
EOSINOPHIL # BLD AUTO: 0.11 K/UL (ref 0–0.5)
EOSINOPHIL NFR BLD AUTO: 1.7 % (ref 1–4)
ERYTHROCYTE [DISTWIDTH] IN BLOOD BY AUTOMATED COUNT: 13.2 % (ref 11.5–14.5)
GLUCOSE SERPL-MCNC: 100 MG/DL (ref 74–100)
HCT VFR BLD AUTO: 51.2 % (ref 40–54)
HGB BLD-MCNC: 16.3 G/DL (ref 13.5–18)
IMM GRANULOCYTES # BLD AUTO: 0.03 K/UL (ref 0–0.04)
IMM GRANULOCYTES NFR BLD: 0.5 % (ref 0–0.4)
LYMPHOCYTES # BLD AUTO: 1.83 K/UL (ref 1–4.8)
LYMPHOCYTES NFR BLD AUTO: 28.5 % (ref 27–41)
MCH RBC QN AUTO: 29 PG (ref 27–31)
MCHC RBC AUTO-ENTMCNC: 31.8 G/DL (ref 32–36)
MCV RBC AUTO: 90.9 FL (ref 80–96)
MONOCYTES # BLD AUTO: 0.88 K/UL (ref 0–0.8)
MONOCYTES NFR BLD AUTO: 13.7 % (ref 2–6)
MPC BLD CALC-MCNC: 11.4 FL (ref 9.4–12.4)
NEUTROPHILS # BLD AUTO: 3.5 K/UL (ref 1.8–7.7)
NEUTROPHILS NFR BLD AUTO: 54.7 % (ref 53–65)
NRBC # BLD AUTO: 0 X10E3/UL
NRBC, AUTO (.00): 0 %
PLATELET # BLD AUTO: 249 K/UL (ref 150–400)
POTASSIUM SERPL-SCNC: 4 MMOL/L (ref 3.5–5.1)
RBC # BLD AUTO: 5.63 M/UL (ref 4.6–6.2)
SODIUM SERPL-SCNC: 137 MMOL/L (ref 136–145)
WBC # BLD AUTO: 6.41 K/UL (ref 4.5–11)

## 2025-05-01 ENCOUNTER — HOSPITAL ENCOUNTER (OUTPATIENT)
Dept: CARDIOLOGY | Facility: HOSPITAL | Age: 40
Discharge: HOME | End: 2025-05-01
Attending: INTERNAL MEDICINE
Payer: COMMERCIAL

## 2025-05-01 ENCOUNTER — HOSPITAL ENCOUNTER (OUTPATIENT)
Dept: RADIOLOGY | Facility: HOSPITAL | Age: 40
Discharge: HOME | End: 2025-05-01
Attending: INTERNAL MEDICINE
Payer: COMMERCIAL

## 2025-05-01 ENCOUNTER — OFFICE VISIT (OUTPATIENT)
Dept: CARDIOLOGY | Facility: CLINIC | Age: 40
End: 2025-05-01
Payer: COMMERCIAL

## 2025-05-01 VITALS
WEIGHT: 217 LBS | BODY MASS INDEX: 28.76 KG/M2 | DIASTOLIC BLOOD PRESSURE: 90 MMHG | OXYGEN SATURATION: 97 % | SYSTOLIC BLOOD PRESSURE: 120 MMHG | HEIGHT: 73 IN | HEART RATE: 55 BPM

## 2025-05-01 DIAGNOSIS — R07.9 CHEST PAIN, UNSPECIFIED TYPE: Primary | ICD-10-CM

## 2025-05-01 DIAGNOSIS — R55 NEAR SYNCOPE: ICD-10-CM

## 2025-05-01 DIAGNOSIS — R94.31 ABNORMAL ELECTROCARDIOGRAM (ECG) (EKG): ICD-10-CM

## 2025-05-01 DIAGNOSIS — R00.2 PALPITATIONS: ICD-10-CM

## 2025-05-01 PROCEDURE — 99213 OFFICE O/P EST LOW 20 MIN: CPT | Mod: PBBFAC,25 | Performed by: INTERNAL MEDICINE

## 2025-05-01 PROCEDURE — 99999 PR PBB SHADOW E&M-EST. PATIENT-LVL III: CPT | Mod: PBBFAC,,, | Performed by: INTERNAL MEDICINE

## 2025-05-01 PROCEDURE — 93005 ELECTROCARDIOGRAM TRACING: CPT | Mod: PBBFAC | Performed by: INTERNAL MEDICINE

## 2025-05-01 PROCEDURE — 93246 EXT ECG>7D<15D RECORDING: CPT

## 2025-05-01 PROCEDURE — 71046 X-RAY EXAM CHEST 2 VIEWS: CPT | Mod: 26,,, | Performed by: RADIOLOGY

## 2025-05-01 PROCEDURE — 71046 X-RAY EXAM CHEST 2 VIEWS: CPT | Mod: TC

## 2025-05-01 PROCEDURE — 93010 ELECTROCARDIOGRAM REPORT: CPT | Mod: S$PBB,,, | Performed by: INTERNAL MEDICINE

## 2025-05-01 PROCEDURE — 99204 OFFICE O/P NEW MOD 45 MIN: CPT | Mod: S$PBB,,, | Performed by: INTERNAL MEDICINE

## 2025-05-02 LAB
OHS QRS DURATION: 102 MS
OHS QTC CALCULATION: 409 MS

## 2025-05-06 PROBLEM — R55 NEAR SYNCOPE: Status: ACTIVE | Noted: 2025-05-06

## 2025-05-06 PROBLEM — R94.31 ABNORMAL ELECTROCARDIOGRAM (ECG) (EKG): Status: ACTIVE | Noted: 2025-05-06

## 2025-05-06 PROBLEM — R00.2 PALPITATIONS: Status: ACTIVE | Noted: 2025-05-06

## 2025-05-06 PROBLEM — R07.9 CHEST PAIN: Status: ACTIVE | Noted: 2025-05-06

## 2025-05-06 NOTE — PROGRESS NOTES
PCP: Gagandeep Grace MD    Referring Provider:     Subjective:   Harshad Bhakta is a 39 y.o. male with hx of gout who presents for cardiac evaluation.  Referred by Dr. Grace for near syncope and palpitations.     Fhx:  Family History   Problem Relation Name Age of Onset    Heart attack Maternal Uncle      Other (acute leukemia) Maternal Grandmother      Diabetes Neg Hx      Stroke Neg Hx       Shx: Social History[1]    EKG   5/1/25--sinus bradycardia, minimal voltage criteria for LVH, cannot rule out inferior infarct- age undetermined, 55 bpm      Lab Results   Component Value Date     04/28/2025    K 4.0 04/28/2025    CL 99 04/28/2025    CO2 25 04/28/2025    BUN 12 04/28/2025    CREATININE 0.94 04/28/2025    CALCIUM 10.3 (H) 04/28/2025    ANIONGAP 17 (H) 04/28/2025    EGFRNONAA 93 07/21/2022       Lab Results   Component Value Date    CHOL 209 (H) 05/01/2025     Lab Results   Component Value Date    HDL 55 05/01/2025     Lab Results   Component Value Date    LDLCALC 123 05/01/2025     Lab Results   Component Value Date    TRIG 156 (H) 05/01/2025     Lab Results   Component Value Date    CHOLHDL 3.8 05/01/2025       Lab Results   Component Value Date    WBC 6.41 04/28/2025    HGB 16.3 04/28/2025    HCT 51.2 04/28/2025    MCV 90.9 04/28/2025     04/28/2025         Current Medications[2]    Review of Systems   Respiratory:  Positive for shortness of breath.    Cardiovascular:  Positive for chest pain and palpitations. Negative for leg swelling.   Neurological:  Positive for loss of consciousness. Negative for dizziness.       History of Present Illness    CHIEF COMPLAINT:  Patient presents today for evaluation of a recent episode of near-syncope and chest discomfort.    HISTORY OF PRESENT ILLNESS:  He experienced an episode of near-syncope and chest discomfort on Monday while working. His symptoms included blurry vision, diaphoresis, and near-syncope. When stepping off work equipment, he  "experienced complete vision loss, chest tightness/pressure, and palpitations. Associated symptoms included chills and generalized body tingling. He reports residual chest and rib soreness for 2 days following the episode. He had a similar cardiac episode 2 years ago while cutting grass and a near-syncopal episode while on a roof 2 months ago.    RESPIRATORY:  He reports 1 year history of dyspnea with minimal exertion, accompanied by a sensation of breath holding. His spouse has noticed labored breathing during sleep.    FATIGUE:  He reports progressive fatigue throughout the day despite feeling well in the mornings, which represents a significant change from his previously active lifestyle that included outdoor work.    SLEEP:  He generally sleeps well with occasional nighttime awakenings and light snoring when extremely fatigued.    MEDICAL HISTORY:  He has a small hiatal hernia causing difficulty eating without medication, specifically noting dysphagia and dyspnea when food becomes lodged, requiring water for relief.    FAMILY HISTORY:  Both parents are alive with no known health problems.    MEDICATIONS:  He takes daily Prilosec for hiatal hernia symptom management.    Objective:   BP (!) 120/90 (BP Location: Left arm, Patient Position: Sitting)   Pulse (!) 55   Ht 6' 1" (1.854 m)   Wt 98.4 kg (217 lb)   SpO2 97%   BMI 28.63 kg/m²     Physical Exam  Vitals reviewed.   Constitutional:       General: He is not in acute distress.     Appearance: Normal appearance.   HENT:      Head: Normocephalic and atraumatic.   Neck:      Vascular: No carotid bruit or JVD.   Cardiovascular:      Rate and Rhythm: Normal rate and regular rhythm.      Pulses: Normal pulses.           Radial pulses are 2+ on the right side and 2+ on the left side.        Dorsalis pedis pulses are 2+ on the right side and 2+ on the left side.      Heart sounds: Normal heart sounds. No murmur heard.  Pulmonary:      Effort: Pulmonary effort is " normal.      Breath sounds: Normal breath sounds.   Musculoskeletal:      Right lower leg: No edema.      Left lower leg: No edema.   Skin:     General: Skin is warm and dry.   Neurological:      Mental Status: He is alert.             Assessment:     1. Chest pain, unspecified type        2. Palpitations        3. Near syncope  Ambulatory referral/consult to Cardiology    EKG 12-lead    Echo    Exercise Stress - EKG    X-Ray Chest PA And Lateral    Lipid Panel    TSH    Cardiac Monitor - 3-15 Day Adult    EKG 12-lead      4. Abnormal electrocardiogram (ECG) (EKG)            Assessment & Plan    R55 Near syncope    IMPRESSION:  - T wave abnormality noted on EKG, though improved from previous EKG.  - Considered potential cardiac causes for symptoms given episode of rapid heart rate and elevated blood pressure.  - Comprehensive cardiac workup planned to rule out serious heart conditions.  - Discussed hiatal hernia and its potential effects on breathing and chest discomfort.    NEAR SYNCOPE:  - Ordered lipid panel Ordered XR Chest Upright Lateral Ordered echocardiogram Ordered treadmill stress test Ordered Zio monitor Follow up after completion of ordered tests to review results and determine next steps         Plan:   Lipids, TSH  Cxr- pa/ lat  Echo  EST  Zio monitor  F/u after tests.       This note was generated with the assistance of ambient listening technology. Verbal consent was obtained by the patient and accompanying visitor(s) for the recording of patient appointment to facilitate this note. I attest to having reviewed and edited the generated note for accuracy, though some syntax or spelling errors may persist. Please contact the author of this note for any clarification.             [1]   Social History  Socioeconomic History    Marital status: Unknown   Tobacco Use    Smoking status: Never   Substance and Sexual Activity    Alcohol use: Yes    Drug use: Never   [2] No current outpatient medications on  file.